# Patient Record
Sex: MALE | Race: WHITE
[De-identification: names, ages, dates, MRNs, and addresses within clinical notes are randomized per-mention and may not be internally consistent; named-entity substitution may affect disease eponyms.]

---

## 2018-04-05 ENCOUNTER — HOSPITAL ENCOUNTER (EMERGENCY)
Dept: HOSPITAL 26 - MED | Age: 18
Discharge: HOME | End: 2018-04-05
Payer: COMMERCIAL

## 2018-04-05 VITALS — DIASTOLIC BLOOD PRESSURE: 78 MMHG | SYSTOLIC BLOOD PRESSURE: 130 MMHG

## 2018-04-05 VITALS — SYSTOLIC BLOOD PRESSURE: 114 MMHG | DIASTOLIC BLOOD PRESSURE: 63 MMHG

## 2018-04-05 VITALS — WEIGHT: 143 LBS | HEIGHT: 68 IN | BODY MASS INDEX: 21.67 KG/M2

## 2018-04-05 DIAGNOSIS — B34.9: Primary | ICD-10-CM

## 2018-04-05 PROCEDURE — 36415 COLL VENOUS BLD VENIPUNCTURE: CPT

## 2018-04-05 PROCEDURE — 96372 THER/PROPH/DIAG INJ SC/IM: CPT

## 2018-04-05 PROCEDURE — 86592 SYPHILIS TEST NON-TREP QUAL: CPT

## 2018-04-05 PROCEDURE — 99284 EMERGENCY DEPT VISIT MOD MDM: CPT
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
## 2018-04-05 NOTE — NUR
Patient discharged with v/s stable. Written and verbal after care instructions 
given and explained. 

Patient alert, oriented and verbalized understanding of instructions. 
Ambulatory with steady gait. All questions addressed prior to discharge. ID 
band removed. Patient advised to follow up with PMD. Rx of BACTITRACIN OINT 
given. Patient educated on indication of medication including possible reaction 
and side effects. Opportunity to ask questions provided and answered.

## 2018-04-06 NOTE — NUR
Pt returned phone call, pt was updated about his results. Pt advised to follow 
up with PMD and request HIV screening per Dr. Harmon recommondation.

## 2018-04-11 ENCOUNTER — HOSPITAL ENCOUNTER (EMERGENCY)
Dept: HOSPITAL 26 - MED | Age: 18
Discharge: HOME | End: 2018-04-11
Payer: COMMERCIAL

## 2018-04-11 VITALS — DIASTOLIC BLOOD PRESSURE: 92 MMHG | SYSTOLIC BLOOD PRESSURE: 142 MMHG

## 2018-04-11 VITALS — HEIGHT: 67 IN | BODY MASS INDEX: 22.76 KG/M2 | WEIGHT: 145 LBS

## 2018-04-11 VITALS — SYSTOLIC BLOOD PRESSURE: 150 MMHG | DIASTOLIC BLOOD PRESSURE: 94 MMHG
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
## 2018-04-11 DIAGNOSIS — S21.111A: Primary | ICD-10-CM
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
## 2018-04-11 DIAGNOSIS — F17.200: ICD-10-CM
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
## 2018-04-11 PROCEDURE — 12001 RPR S/N/AX/GEN/TRNK 2.5CM/<: CPT

## 2018-04-11 PROCEDURE — 90715 TDAP VACCINE 7 YRS/> IM: CPT

## 2018-04-11 PROCEDURE — 96372 THER/PROPH/DIAG INJ SC/IM: CPT

## 2018-04-11 PROCEDURE — 99284 EMERGENCY DEPT VISIT MOD MDM: CPT

## 2018-04-11 PROCEDURE — 71045 X-RAY EXAM CHEST 1 VIEW: CPT

## 2018-04-11 PROCEDURE — 90471 IMMUNIZATION ADMIN: CPT
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
## 2018-04-11 NOTE — NUR
Recontacted [place] [employer] dispatch to advise the responding officers that the 
patient has  been discharged.

## 2018-04-11 NOTE — NUR
PT WANTS TO USE HIS SISTER AS HIS EMERGENCY CONTACT #T 99.0 P 112 R 20 B/P 
141/89. NO S/S OF DISTRESS. PT SAID IM BORED AND TIRED.

## 2018-04-11 NOTE — NUR
Patient discharged with v/s stable. Written and verbal after care instructions 
given and explained. 

Patient alert, oriented and verbalized understanding of instructions. 
Ambulatory with steady gait. All questions addressed prior to discharge. ID 
band removed. Patient advised to follow up with PMD. Rx of keflex given. 
Patient educated on indication of medication including possible reaction and 
side effects. Opportunity to ask questions provided and answered.

## 2018-04-12 ENCOUNTER — HOSPITAL ENCOUNTER (EMERGENCY)
Dept: HOSPITAL 26 - MED | Age: 18
Discharge: ANOTHER HOSPITAL | End: 2018-04-12
Payer: COMMERCIAL

## 2018-04-12 VITALS — DIASTOLIC BLOOD PRESSURE: 78 MMHG | SYSTOLIC BLOOD PRESSURE: 130 MMHG

## 2018-04-12 VITALS — DIASTOLIC BLOOD PRESSURE: 94 MMHG | SYSTOLIC BLOOD PRESSURE: 134 MMHG

## 2018-04-12 VITALS — BODY MASS INDEX: 26.68 KG/M2 | HEIGHT: 67 IN | WEIGHT: 170 LBS

## 2018-04-12 DIAGNOSIS — S92.001B: Primary | ICD-10-CM
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
## 2018-04-12 LAB
ALBUMIN FLD-MCNC: 3.8 G/DL (ref 3.4–5)
ANION GAP SERPL CALCULATED.3IONS-SCNC: 15.8 MMOL/L (ref 8–16)
AST SERPL-CCNC: 40 U/L (ref 15–37)
BASOPHILS # BLD AUTO: 0.1 K/UL (ref 0–0.22)
BASOPHILS NFR BLD AUTO: 0.4 % (ref 0–2)
BILIRUB SERPL-MCNC: 0.8 MG/DL (ref 0–1)
BUN SERPL-MCNC: 14 MG/DL (ref 7–18)
CHLORIDE SERPL-SCNC: 101 MMOL/L (ref 98–107)
CO2 SERPL-SCNC: 26.1 MMOL/L (ref 21–32)
CREAT SERPL-MCNC: 0.9 MG/DL (ref 0.7–1.3)
EOSINOPHIL # BLD AUTO: 0.2 K/UL (ref 0–0.4)
EOSINOPHIL NFR BLD AUTO: 1.3 % (ref 0–4)
ERYTHROCYTE [DISTWIDTH] IN BLOOD BY AUTOMATED COUNT: 13.7 % (ref 11.6–13.7)
GFR SERPL CREATININE-BSD FRML MDRD: 138 ML/MIN (ref 90–?)
GLUCOSE SERPL-MCNC: 97 MG/DL (ref 74–106)
HCT VFR BLD AUTO: 39.7 % (ref 36–52)
HGB BLD-MCNC: 13.5 G/DL (ref 12–18)
LYMPHOCYTES # BLD AUTO: 2.4 K/UL (ref 2–11.5)
LYMPHOCYTES NFR BLD AUTO: 18.3 % (ref 20.5–51.1)
MCH RBC QN AUTO: 30 PG (ref 27–31)
MCHC RBC AUTO-ENTMCNC: 34 G/DL (ref 33–37)
MCV RBC AUTO: 87.3 FL (ref 80–94)
MONOCYTES # BLD AUTO: 1 K/UL (ref 0.8–1)
MONOCYTES NFR BLD AUTO: 7.4 % (ref 1.7–9.3)
NEUTROPHILS # BLD AUTO: 9.6 K/UL (ref 1.8–7.7)
NEUTROPHILS NFR BLD AUTO: 72.6 % (ref 42.2–75.2)
PLATELET # BLD AUTO: 308 K/UL (ref 140–450)
POTASSIUM SERPL-SCNC: 3.9 MMOL/L (ref 3.5–5.1)
PROTHROMBIN TIME: 10.8 SECS (ref 10.8–13.4)
RBC # BLD AUTO: 4.55 MIL/UL (ref 4.2–6.1)
SODIUM SERPL-SCNC: 139 MMOL/L (ref 136–145)
WBC # BLD AUTO: 13.2 K/UL (ref 4.5–11)

## 2018-04-12 PROCEDURE — 36415 COLL VENOUS BLD VENIPUNCTURE: CPT

## 2018-04-12 PROCEDURE — 83880 ASSAY OF NATRIURETIC PEPTIDE: CPT

## 2018-04-12 PROCEDURE — 85610 PROTHROMBIN TIME: CPT

## 2018-04-12 PROCEDURE — 99285 EMERGENCY DEPT VISIT HI MDM: CPT

## 2018-04-12 PROCEDURE — 85025 COMPLETE CBC W/AUTO DIFF WBC: CPT

## 2018-04-12 PROCEDURE — 73630 X-RAY EXAM OF FOOT: CPT

## 2018-04-12 PROCEDURE — 96365 THER/PROPH/DIAG IV INF INIT: CPT

## 2018-04-12 PROCEDURE — 96375 TX/PRO/DX INJ NEW DRUG ADDON: CPT

## 2018-04-12 PROCEDURE — 71045 X-RAY EXAM CHEST 1 VIEW: CPT

## 2018-04-12 PROCEDURE — 73700 CT LOWER EXTREMITY W/O DYE: CPT

## 2018-04-12 PROCEDURE — 29515 APPLICATION SHORT LEG SPLINT: CPT

## 2018-04-12 PROCEDURE — 96376 TX/PRO/DX INJ SAME DRUG ADON: CPT

## 2018-04-12 PROCEDURE — 85730 THROMBOPLASTIN TIME PARTIAL: CPT

## 2018-04-12 PROCEDURE — 73590 X-RAY EXAM OF LOWER LEG: CPT

## 2018-04-12 PROCEDURE — 80053 COMPREHEN METABOLIC PANEL: CPT

## 2018-07-07 ENCOUNTER — HOSPITAL ENCOUNTER (EMERGENCY)
Dept: HOSPITAL 26 - MED | Age: 18
Discharge: HOME | End: 2018-07-07
Payer: COMMERCIAL

## 2018-07-07 VITALS — HEIGHT: 68 IN | BODY MASS INDEX: 28.79 KG/M2 | WEIGHT: 190 LBS

## 2018-07-07 VITALS — SYSTOLIC BLOOD PRESSURE: 142 MMHG | DIASTOLIC BLOOD PRESSURE: 78 MMHG

## 2018-07-07 DIAGNOSIS — J45.909: ICD-10-CM

## 2018-07-07 DIAGNOSIS — S61.214A: ICD-10-CM
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
## 2018-07-07 DIAGNOSIS — S61.412A: Primary | ICD-10-CM
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
## 2018-07-07 PROCEDURE — 99284 EMERGENCY DEPT VISIT MOD MDM: CPT

## 2018-07-07 PROCEDURE — 12004 RPR S/N/AX/GEN/TRK7.6-12.5CM: CPT

## 2018-07-07 PROCEDURE — 73130 X-RAY EXAM OF HAND: CPT
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
## 2018-07-08 VITALS — DIASTOLIC BLOOD PRESSURE: 88 MMHG | SYSTOLIC BLOOD PRESSURE: 125 MMHG

## 2018-07-08 NOTE — NUR
Patient discharged with v/s stable. Written and verbal after care instructions 
given and explained. Patient alert, oriented and verbalized understanding of 
instructions. Ambulatory with steady gait. All questions addressed prior to 
discharge. ID band removed. Patient advised to follow up with PMD. Patient 
educated on indication of medication including possible reaction and side 
effects. Opportunity to ask questions provided and answered.

## 2018-07-10 ENCOUNTER — HOSPITAL ENCOUNTER (EMERGENCY)
Dept: HOSPITAL 26 - MED | Age: 18
Discharge: HOME | End: 2018-07-10
Payer: COMMERCIAL

## 2018-07-10 VITALS — DIASTOLIC BLOOD PRESSURE: 84 MMHG | SYSTOLIC BLOOD PRESSURE: 126 MMHG

## 2018-07-10 VITALS — BODY MASS INDEX: 22.51 KG/M2 | HEIGHT: 69 IN | WEIGHT: 152 LBS

## 2018-07-10 VITALS — SYSTOLIC BLOOD PRESSURE: 126 MMHG | DIASTOLIC BLOOD PRESSURE: 84 MMHG

## 2018-07-10 DIAGNOSIS — L40.9: ICD-10-CM

## 2018-07-10 DIAGNOSIS — J45.909: ICD-10-CM

## 2018-07-10 DIAGNOSIS — S61.412D: Primary | ICD-10-CM

## 2018-07-10 DIAGNOSIS — Z48.01: ICD-10-CM

## 2018-07-10 DIAGNOSIS — S61.216D: ICD-10-CM
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
